# Patient Record
Sex: MALE | Race: WHITE | NOT HISPANIC OR LATINO | URBAN - METROPOLITAN AREA
[De-identification: names, ages, dates, MRNs, and addresses within clinical notes are randomized per-mention and may not be internally consistent; named-entity substitution may affect disease eponyms.]

---

## 2018-09-05 ENCOUNTER — IMPORTED ENCOUNTER (OUTPATIENT)
Dept: URBAN - METROPOLITAN AREA CLINIC 31 | Facility: CLINIC | Age: 48
End: 2018-09-05

## 2018-09-05 PROCEDURE — 92015 DETERMINE REFRACTIVE STATE: CPT

## 2018-09-05 PROCEDURE — 92004 COMPRE OPH EXAM NEW PT 1/>: CPT

## 2019-03-27 ENCOUNTER — IMPORTED ENCOUNTER (OUTPATIENT)
Dept: URBAN - METROPOLITAN AREA CLINIC 31 | Facility: CLINIC | Age: 49
End: 2019-03-27

## 2019-03-27 PROCEDURE — 92014 COMPRE OPH EXAM EST PT 1/>: CPT

## 2019-03-27 PROCEDURE — 92015 DETERMINE REFRACTIVE STATE: CPT

## 2019-03-27 NOTE — PATIENT DISCUSSION
1.  Refractive error - Glasses change optional. 2.  Return for an appointment in 1 year for comprehensive exam. with Dr. Otilia Browning

## 2020-07-28 ENCOUNTER — IMPORTED ENCOUNTER (OUTPATIENT)
Dept: URBAN - METROPOLITAN AREA CLINIC 31 | Facility: CLINIC | Age: 50
End: 2020-07-28

## 2020-07-28 PROCEDURE — 92014 COMPRE OPH EXAM EST PT 1/>: CPT

## 2020-07-28 PROCEDURE — 92015 DETERMINE REFRACTIVE STATE: CPT

## 2020-07-28 NOTE — PATIENT DISCUSSION
1.  Refractive error - Was unsuccessful with progressives. SV for and near for computer. 2. Return for an appointment in 1 year for comprehensive exam. with Dr. Alesha Clement.

## 2021-08-31 ENCOUNTER — IMPORTED ENCOUNTER (OUTPATIENT)
Dept: URBAN - METROPOLITAN AREA CLINIC 31 | Facility: CLINIC | Age: 51
End: 2021-08-31

## 2021-08-31 PROCEDURE — 92015 DETERMINE REFRACTIVE STATE: CPT

## 2021-08-31 PROCEDURE — 92014 COMPRE OPH EXAM EST PT 1/>: CPT

## 2021-08-31 NOTE — PATIENT DISCUSSION
1.  Refractive error - Glasses change optional. May be interested in LASIK for distance OU. 2. Return for an appointment in 1 year for comprehensive exam. with Dr. Pleitez Sees. non-distended/non-tender

## 2022-04-02 ASSESSMENT — VISUAL ACUITY
OD_CC: 20/40-2
OS_SC: 20/30-2
OD_SC: 20/20
OS_SC: 20/20
OD_SC: 20/30-1
OD_SC: 20/20
OS_SC: 20/20-1
OU_SC: 20/30
OU_SC: J3
OS_CC: 20/25-2
OD_CC: 20/50+2
OS_CC: 20/40-2
OU_SC: 20/20-1

## 2022-04-02 ASSESSMENT — TONOMETRY
OS_IOP_MMHG: 15
OD_IOP_MMHG: 17
OD_IOP_MMHG: 16
OS_IOP_MMHG: 17
OD_IOP_MMHG: 15
OS_IOP_MMHG: 15
OS_IOP_MMHG: 18
OD_IOP_MMHG: 15

## 2022-07-09 ENCOUNTER — TELEPHONE ENCOUNTER (OUTPATIENT)
Dept: URBAN - METROPOLITAN AREA CLINIC 121 | Facility: CLINIC | Age: 52
End: 2022-07-09

## 2022-07-10 ENCOUNTER — TELEPHONE ENCOUNTER (OUTPATIENT)
Dept: URBAN - METROPOLITAN AREA CLINIC 121 | Facility: CLINIC | Age: 52
End: 2022-07-10